# Patient Record
Sex: MALE | Race: WHITE | NOT HISPANIC OR LATINO | Employment: OTHER | URBAN - METROPOLITAN AREA
[De-identification: names, ages, dates, MRNs, and addresses within clinical notes are randomized per-mention and may not be internally consistent; named-entity substitution may affect disease eponyms.]

---

## 2017-02-22 ENCOUNTER — ALLSCRIPTS OFFICE VISIT (OUTPATIENT)
Dept: OTHER | Facility: OTHER | Age: 82
End: 2017-02-22

## 2017-03-23 ENCOUNTER — ALLSCRIPTS OFFICE VISIT (OUTPATIENT)
Dept: OTHER | Facility: OTHER | Age: 82
End: 2017-03-23

## 2017-03-23 DIAGNOSIS — L98.9 DISORDER OF SKIN OR SUBCUTANEOUS TISSUE: ICD-10-CM

## 2017-04-21 ENCOUNTER — ALLSCRIPTS OFFICE VISIT (OUTPATIENT)
Dept: OTHER | Facility: OTHER | Age: 82
End: 2017-04-21

## 2018-02-19 ENCOUNTER — OFFICE VISIT (OUTPATIENT)
Dept: DERMATOLOGY | Facility: CLINIC | Age: 83
End: 2018-02-19
Payer: COMMERCIAL

## 2018-02-19 DIAGNOSIS — Z85.828 HISTORY OF SKIN CANCER: ICD-10-CM

## 2018-02-19 DIAGNOSIS — L98.9 SKIN LESION: Primary | ICD-10-CM

## 2018-02-19 DIAGNOSIS — L82.1 SEBORRHEIC KERATOSIS: ICD-10-CM

## 2018-02-19 DIAGNOSIS — Z13.89 SCREENING FOR SKIN CONDITION: ICD-10-CM

## 2018-02-19 DIAGNOSIS — L57.0 ACTINIC KERATOSIS: ICD-10-CM

## 2018-02-19 PROCEDURE — 11100 PR BIOPSY OF SKIN LESION: CPT | Performed by: DERMATOLOGY

## 2018-02-19 PROCEDURE — 17000 DESTRUCT PREMALG LESION: CPT | Performed by: DERMATOLOGY

## 2018-02-19 PROCEDURE — 99213 OFFICE O/P EST LOW 20 MIN: CPT | Performed by: DERMATOLOGY

## 2018-02-19 PROCEDURE — 17003 DESTRUCT PREMALG LES 2-14: CPT | Performed by: DERMATOLOGY

## 2018-02-19 RX ORDER — BIOTIN 1 MG
TABLET ORAL
COMMUNITY

## 2018-02-19 RX ORDER — DUTASTERIDE AND TAMSULOSIN HYDROCHLORIDE CAPSULES .5; .4 MG/1; MG/1
1 CAPSULE ORAL
COMMUNITY
Start: 2017-11-02

## 2018-02-19 NOTE — PATIENT INSTRUCTIONS
Skin lesion possible squamous cell carcinoma await results of biopsy before planning complete excision  Wound care instructions given to patient  Actinic Keratosis:  Patient advised lesions are precancers  These should resolve with cryosurgery if not to let us know sun block recommended    Seborrheic keratosis patient reassured these are normal growths we acquire with age no treatment needed  History of skin cancer in no recurrence nothing else atypical sunblock recommended follow-up in 4 months  Screening for dermatologic disorders nothing else of concern noted on complete exam follow-up in 4 months

## 2018-02-19 NOTE — PROGRESS NOTES
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  Authorizing Provider   Cholecalciferol (VITAMIN D3) 1000 units CAPS Take by mouth   Yes Historical Provider, MD   cyanocobalamin 100 MCG tablet Take by mouth   Yes Historical Provider, MD   Dutasteride-Tamsulosin HCl 0 5-0 4 MG CAPS Take 1 capsule by mouth 17  Yes Historical Provider, MD   triamcinolone (KENALOG) 0 1 % ointment Apply topically 17  Yes Historical Provider, MD       Subjective:     Review of Systems:    General: negative for - chills, fatigue, fever,  weight gain or weight loss  Psychological: negative for - anxiety, behavioral disorder, concentration difficulties, decreased libido, depression, irritability, memory difficulties, mood swings, sleep disturbances or suicidal ideation  ENT: negative for - hearing difficulties , nasal congestion, nasal discharge, oral lesions, sinus pain, sneezing, sore throat  Allergy and Immunology: negative for - hives, insect bite sensitivity,  Hematological and Lymphatic: negative for - bleeding problems, blood clots,bruising, swollen lymph nodes  Endocrine: negative for - hair pattern changes, hot flashes, malaise/lethargy, mood swings, palpitations, polydipsia/polyuria, skin changes, temperature intolerance or unexpected weight change  Respiratory: negative for - cough, hemoptysis, orthopnea, shortness of breath, or wheezing  Cardiovascular: negative for - chest pain, dyspnea on exertion, edema,  Gastrointestinal: negative for - abdominal pain, nausea/vomiting  Genito-Urinary: negative for - dysuria, incontinence, irregular/heavy menses or urinary frequency/urgency  Musculoskeletal: negative for - gait disturbance, joint pain, joint stiffness, joint swelling, muscle pain, muscular weakness  Dermatological:  As in HPI  Neurological: negative for confusion, dizziness, headaches, impaired coordination/balance, memory loss, numbness/tingling, seizures, speech problems, tremors or weakness       Objective: There were no vitals taken for this visit  Physical Exam:    General Appearance:    Alert, cooperative, no distress   Head:    Normocephalic, without obvious abnormality, atraumatic           Skin:   A full skin exam was performed including scalp, head scalp, eyes, ears, nose, lips, neck, chest, axilla, abdomen, back, buttocks, bilateral upper extremities, bilateral lower extremities, hands, feet, fingers, toes, fingernails, and toenails  Dome-shaped 7 mm indurated papule noted on the left dorsum of the hand scaling erythematous areas noted below normal keratotic papules with peak greasy stuck on appearance previous sites of skin cancer well healed without recurrence nothing else atypical noted on exam       Physical Exam   Constitutional:       HENT:   Head:       Shave Biopsy Procedure Note    Pre-operative Diagnosis:    Plan:  1  Instructed to keep the wound dry and covered for 24 and clean thereafter  2  Warning signs of infection were reviewed      3  Recommended that the patient use OTC acetaminophen as needed for pain  4  Return  Pending results of biopsy(ies)    Locations:L dorsum of the hand    Indications: r/o squamous cell cancer    Anesthesia: Lidocaine 1% without epinephrine without added sodium bicarbonate    Procedure Details     Patient informed of the risks (including bleeding and infection) and benefits of the   procedure and Verbal informed consent obtained  The lesion and surrounding area were given a sterile prep using alcohol and draped in the usual sterile fashion  A Blue blade razor was used to obtain a specimen  Hemostasis achieved with aluminum chloride  Petrolatum and a sterile dressing applied  The specimen was sent for pathologic examination  The patient tolerated the procedure(s) well  Complications:  none  Cryotherapy Procedure Note    Pre-operative Diagnosis: actinic keratosis    Plan:  1  Instructed to keep the area dry and clean thereafter  Apply petrolatum if area gets crusty  2  Recommended that the patient use acetaminophen  as needed for pain  Locations: R ear and dorsum of L hand    Indications: Destruction of actinic keratosis x5    Patient informed of risks (permanent scarring, infection, light or dark discoloration, bleeding, infection, weakness, numbness and recurrence of the lesion) and benefits of the procedure and verbal informed consent obtained  The areas are treated with liquid nitrogen therapy, frozen until ice ball extended 2 mm beyond lesion, allowed to thaw, and treated again  The patient tolerated procedure well  The patient was instructed on post-op care, warned that there may be blister formation, redness and pain  Recommend OTC analgesia as needed for pain  Condition:  Stable    Complications:  none  Assessment:     1  History of skin cancer     2  Screening for skin condition     3  Actinic keratosis     4  Skin lesion     5   Seborrheic keratosis           Plan:   Skin lesion possible squamous cell carcinoma await results of biopsy before planning complete excision  Actinic Keratosis:  Patient advised lesions are precancers  These should resolve with cryosurgery if not to let us know sun block recommended  Seborrheic keratosis patient reassured these are normal growths we acquire with age no treatment needed  History of skin cancer in no recurrence nothing else atypical sunblock recommended follow-up in 4 months  Screening for dermatologic disorders nothing else of concern noted on complete exam follow-up in 4 months    Arely Rosado MD  2/19/2018,12:41 PM    Portions of the record may have been created with voice recognition software   Occasional wrong word or "sound a like" substitutions may have occurred due to the inherent limitations of voice recognition software   Read the chart carefully and recognize, using context, where substitutions have occurred

## 2018-03-13 ENCOUNTER — PROCEDURE VISIT (OUTPATIENT)
Dept: DERMATOLOGY | Facility: CLINIC | Age: 83
End: 2018-03-13
Payer: COMMERCIAL

## 2018-03-13 ENCOUNTER — TELEPHONE (OUTPATIENT)
Dept: DERMATOLOGY | Facility: CLINIC | Age: 83
End: 2018-03-13

## 2018-03-13 DIAGNOSIS — L20.9 ATOPIC DERMATITIS AND RELATED CONDITION: Primary | ICD-10-CM

## 2018-03-13 DIAGNOSIS — C44.629 SQUAMOUS CELL CARCINOMA OF DORSUM OF LEFT HAND: Primary | ICD-10-CM

## 2018-03-13 PROCEDURE — 11623 EXC S/N/H/F/G MAL+MRG 2.1-3: CPT | Performed by: DERMATOLOGY

## 2018-03-13 PROCEDURE — 12042 INTMD RPR N-HF/GENIT2.6-7.5: CPT | Performed by: DERMATOLOGY

## 2018-03-13 NOTE — PROGRESS NOTES
[address] [facility] DERMATOLOGY  [address]  [address] [address]     MRN: [id number] : 3/10/1929  Encounter: [id number]  Patient Information: Karissa Null    Subjective:     79-year-old male presents for previously biopsied squamous cell carcinoma left dorsum hand the lesion has been growing since patient was last     Objective: There were no vitals taken for this visit  Physical Exam:    General Appearance:    Alert, cooperative, no distress   Skin:  13 mm keratotic nodule dorsum a left  awd     Procedure name: Excision with intermediate layered closure        Location:  Left dorsum of the hand    Diagnosis: Squamous cell carcinoma    Size of lesion: 13 mm    Margins: 4 mm    Size + Margins: 21mm    I explained the diagnosis to the patient and we recommend an excision of the lesion for diagnosis and/or treatment  Potential complications include, but are not limited to: scarring, bleeding, infection, incomplete removal, recurrence, and nerve damage  The risks, benefits, and alternatives were discussed with the patient in detail  The location of the tumor was identified, circled, and confirmed by the patient  The correct patient, site, and procedure were confirmed  Anesthesia: 1% xylocaine with 1:100,000 epinephrine 6 cc  The patient was brought into the room, prepped and draped sterilely in the usual manner and anesthesia was administered by local infiltration  A fusiform shape was drawn around the lesion, and the margins were incised to the level of the subcutaneous fat with a number 15cc Bard-Juanpablo blade  The tissue was removed with sharp and blunt dissection  The lateral margins of the resulting defect were undermined with sharp and blunt dissection  Hemostasis was achieved with electrocautery  The deeper layers of the defect, including subcutaneous fat and fascia, had to be approximated to reduce tension on the suture line  Layered wound closure was performed  The wound was cleaned with saline, dried off, petroleum applied, and the wound was covered with a pressure dressing  The patient was given detailed verbal and written instructions on postoperative care  Suture for adipose/fascial/dermal layer closure: 4-0  vicryl 4sutures interrupted    Suture used to approximate epidermis: 5-0  nylon 9sutures interrupted    Final wound length: 5 0 cm    Follow-up in: 10 days  Patient tolerated procedure well without complications  Assessment:     1  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 Authorizing Provider   Cholecalciferol (VITAMIN D3) 1000 units CAPS Take by mouth   Yes Historical Provider, MD   cyanocobalamin 100 MCG tablet Take by mouth   Yes Historical Provider, MD   Dutasteride-Tamsulosin HCl 0 5-0 4 MG CAPS Take 1 capsule by mouth 11/2/17  Yes Historical Provider, MD   triamcinolone (KENALOG) 0 1 % ointment Apply topically 2/22/17  Yes Historical Provider, MD     No Known Allergies    Pawan Rae MD  3/13/2018,2:53 PM    Portions of the record may have been created with voice recognition software   Occasional wrong word or "sound a like" substitutions may have occurred due to the inherent limitations of voice recognition software   Read the chart carefully and recognize, using context, where substitutions have occurred

## 2018-03-23 ENCOUNTER — CLINICAL SUPPORT (OUTPATIENT)
Dept: DERMATOLOGY | Facility: CLINIC | Age: 83
End: 2018-03-23

## 2018-03-23 DIAGNOSIS — C44.629 SQUAMOUS CELL CARCINOMA OF DORSUM OF LEFT HAND: Primary | ICD-10-CM

## 2018-03-23 PROCEDURE — 99024 POSTOP FOLLOW-UP VISIT: CPT | Performed by: DERMATOLOGY

## 2018-03-23 NOTE — PATIENT INSTRUCTIONS
STERI-STRIPS (BUTTERFLY) POST SURGERY        Steri-Strips have been placed over your incision site to give added support  Please continue to bathe the area as usual     Eventually the Steri-Strips will begin to peel off on the ends  Snip the raised ends off with scissors and let the rest fall off on their own  If you have any questions, please call our office   [phone]

## 2018-03-23 NOTE — PROGRESS NOTES
[address] [address] [facility] DERMATOLOGY  [address]  [address] [address]     MRN: [id number] : 3/10/1929  Encounter: [id number]  Patient Information: Prateek Tovar    Subjective:     41-year-old male presents for follow-up for previously excised squamous cell carcinoma left dorsum of the hand     Objective: There were no vitals taken for this visit  Physical Exam:    General Appearance:    Alert, cooperative, no distress   Skin:    Previous site of excision with slight dehiscence notedProcedure:  Suture removal  Site of excision: left dorsum of the hand   Wound was cleansed with saline  Wound is intact  Sutures removed  Steri-Strips applied  Biopsy revealed  Squamous cell carcinoma margins clear           Assessment:     1  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 Authorizing Provider   Cholecalciferol (VITAMIN D3) 1000 units CAPS Take by mouth    Historical Provider, MD   cyanocobalamin 100 MCG tablet Take by mouth    Historical Provider, MD   Dutasteride-Tamsulosin HCl 0 5-0 4 MG CAPS Take 1 capsule by mouth 17   Historical Provider, MD   triamcinolone (KENALOG) 0 1 % ointment Apply topically 2 (two) times a day 3/13/18   Pati Mccoy MD     No Known Allergies    Pati Mccoy MD  3/23/2018,10:49 AM    Portions of the record may have been created with voice recognition software   Occasional wrong word or "sound a like" substitutions may have occurred due to the inherent limitations of voice recognition software   Read the chart carefully and recognize, using context, where substitutions have occurred

## 2018-04-13 ENCOUNTER — CLINICAL SUPPORT (OUTPATIENT)
Dept: DERMATOLOGY | Facility: CLINIC | Age: 83
End: 2018-04-13

## 2018-04-13 DIAGNOSIS — C44.629 SQUAMOUS CELL CARCINOMA OF DORSUM OF LEFT HAND: Primary | ICD-10-CM

## 2018-04-13 PROCEDURE — 99024 POSTOP FOLLOW-UP VISIT: CPT | Performed by: DERMATOLOGY

## 2018-04-13 NOTE — PROGRESS NOTES
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 Authorizing Provider   Cholecalciferol (VITAMIN D3) 1000 units CAPS Take by mouth    Historical Provider, MD   cyanocobalamin 100 MCG tablet Take by mouth    Historical Provider, MD   Dutasteride-Tamsulosin HCl 0 5-0 4 MG CAPS Take 1 capsule by mouth 17   Historical Provider, MD   triamcinolone (KENALOG) 0 1 % ointment Apply topically 2 (two) times a day 3/13/18   Yadiel Alan MD     No Known Allergies    Yadiel Alan MD  ,59:29 AM    Portions of the record may have been created with voice recognition software   Occasional wrong word or "sound a like" substitutions may have occurred due to the inherent limitations of voice recognition software   Read the chart carefully and recognize, using context, where substitutions have occurred

## 2018-04-13 NOTE — PATIENT INSTRUCTIONS
Previous excision site well-healed without evidence of disease no further treatment needed follow-up as previously scheduled

## 2018-06-20 ENCOUNTER — OFFICE VISIT (OUTPATIENT)
Dept: DERMATOLOGY | Facility: CLINIC | Age: 83
End: 2018-06-20
Payer: COMMERCIAL

## 2018-06-20 DIAGNOSIS — L57.0 ACTINIC KERATOSIS: Primary | ICD-10-CM

## 2018-06-20 DIAGNOSIS — L82.1 SEBORRHEIC KERATOSIS: ICD-10-CM

## 2018-06-20 DIAGNOSIS — Z85.828 HISTORY OF SKIN CANCER: ICD-10-CM

## 2018-06-20 DIAGNOSIS — L98.9 SKIN LESION: ICD-10-CM

## 2018-06-20 DIAGNOSIS — Z13.89 SCREENING FOR SKIN CONDITION: ICD-10-CM

## 2018-06-20 PROCEDURE — 17003 DESTRUCT PREMALG LES 2-14: CPT | Performed by: DERMATOLOGY

## 2018-06-20 PROCEDURE — 17000 DESTRUCT PREMALG LESION: CPT | Performed by: DERMATOLOGY

## 2018-06-20 PROCEDURE — 88305 TISSUE EXAM BY PATHOLOGIST: CPT | Performed by: PATHOLOGY

## 2018-06-20 PROCEDURE — 99213 OFFICE O/P EST LOW 20 MIN: CPT | Performed by: DERMATOLOGY

## 2018-06-20 PROCEDURE — 11100 PR BIOPSY OF SKIN LESION: CPT | Performed by: DERMATOLOGY

## 2018-06-20 RX ORDER — TAMSULOSIN HYDROCHLORIDE 0.4 MG/1
0.4 CAPSULE ORAL
COMMUNITY
Start: 2018-05-23 | End: 2019-05-23

## 2018-06-20 RX ORDER — DUTASTERIDE 0.5 MG/1
0.5 CAPSULE, LIQUID FILLED ORAL
COMMUNITY
Start: 2018-05-23 | End: 2019-05-23

## 2018-06-20 NOTE — PATIENT INSTRUCTIONS
skin lesion possible squamous cell carcinoma will discuss potential for removal when we get the biopsy results would  Probably need excision will discuss about either coming back here or seeing  a dermatologist in his new location  Actinic Keratosis:  Patient advised lesions are precancers  These should resolve with cryosurgery if not to let us know sun block recommended  Seborrheic keratosis patient reassured these are normal growths we acquire with age no treatment needed  History of skin cancer in no recurrence nothing else atypical sunblock recommended follow-up in 6 months  Screening for dermatologic disorders nothing else of concern noted on complete exam follow-up in 6 months  abdominal wall  Treatment with Cryotherapy    The doctor has treated your skin with nitrogen, which is 320 degrees Fahrenheit below zero  He has given the treated area "frostbite "    Stinging should subside within a few hours  You can take Tylenol for pain, if needed  Over the next few days, it is normal if the area becomes reddened, a blood blister, or swollen with fluid  If the lesion treated was near the eye - you could get a swollen eye over the next few days  Do not panic! This is all temporary, and will resolve with time  There is no special treatment - just keep the area clean  Makeup and BandAids can be used, if preferred  When the area starts to dry up and peel off, using Vaseline can help healing  It usually takes up to a month for it to heal   Some lesions are recurrent and may require repeat treatments  If a lesion has not healed in one month, please don't hesitate to contact us  If you have any further questions that are not answered here, please call us  [phone]    Thank you for allowing us to care for you

## 2018-06-20 NOTE — PROGRESS NOTES
[address] [facility] DERMATOLOGY  [address]  [address] [address]     MRN: [id number] : 3/10/1929  Encounter: [id number]  Patient Information: Buzzus Meckel  Chief complaint:Skin cancer checkup    History of present illness:  77-year-old male with history of multiple skin cancers presents for overall checkup of note patient will be relocating to [facility]  At the end of the month  Patient with recent excised squamous cell carcinoma left hand  No past medical history on file  No past surgical history on file  Social History   History   Alcohol use Not on file     History   Drug use: Unknown     History   Smoking Status    Never Smoker   Smokeless Tobacco    Never Used     No family history on file  Meds/Allergies   No Known Allergies    Meds:  Prior to Admission medications    Medication Sig Start Date End Date Taking?  Authorizing Provider   dutasteride (AVODART) 0 5 mg capsule Take 0 5 mg by mouth 18 Yes Historical Provider, MD   tamsulosin (FLOMAX) 0 4 mg Take 0 4 mg by mouth 18 Yes Historical Provider, MD   Cholecalciferol (VITAMIN D3) 1000 units CAPS Take by mouth    Historical Provider, MD   cyanocobalamin 100 MCG tablet Take by mouth    Historical Provider, MD   Dutasteride-Tamsulosin HCl 0 5-0 4 MG CAPS Take 1 capsule by mouth 17   Historical Provider, MD   triamcinolone (KENALOG) 0 1 % ointment Apply topically 2 (two) times a day 3/13/18   Jessie Correa MD       Subjective:     Review of Systems:    General: negative for - chills, fatigue, fever,  weight gain or weight loss  Psychological: negative for - anxiety, behavioral disorder, concentration difficulties, decreased libido, depression, irritability, memory difficulties, mood swings, sleep disturbances or suicidal ideation  ENT: negative for - hearing difficulties , nasal congestion, nasal discharge, oral lesions, sinus pain, sneezing, sore throat  Allergy and Immunology: negative for - hives, insect bite sensitivity,  Hematological and Lymphatic: negative for - bleeding problems, blood clots,bruising, swollen lymph nodes  Endocrine: negative for - hair pattern changes, hot flashes, malaise/lethargy, mood swings, palpitations, polydipsia/polyuria, skin changes, temperature intolerance or unexpected weight change  Respiratory: negative for - cough, hemoptysis, orthopnea, shortness of breath, or wheezing  Cardiovascular: negative for - chest pain, dyspnea on exertion, edema,  Gastrointestinal: negative for - abdominal pain, nausea/vomiting  Genito-Urinary: negative for - dysuria, incontinence, irregular/heavy menses or urinary frequency/urgency  Musculoskeletal: negative for - gait disturbance, joint pain, joint stiffness, joint swelling, muscle pain, muscular weakness  Dermatological:  As in HPI  Neurological: negative for confusion, dizziness, headaches, impaired coordination/balance, memory loss, numbness/tingling, seizures, speech problems, tremors or weakness       Objective: There were no vitals taken for this visit  Physical Exam:    General Appearance:    Alert, cooperative, no distress   Head:    Normocephalic, without obvious abnormality, atraumatic           Skin:   A full skin exam was performed including scalp, head scalp, eyes, ears, nose, lips, neck, chest, axilla, abdomen, back, buttocks, bilateral upper extremities, bilateral lower extremities, hands, feet, fingers, toes, fingernails, and toenails  A indurated 12 mm keratotic papule noted left angle of the jaw scaling erythematous areas noted below normal keratotic papules with greasy stuck on appearance previous sites of skin cancer well healed without recurrence nothing else atypical noted on exam   Physical Exam   Constitutional:       HENT:   Head:            Shave Biopsy Procedure Note    Pre-operative Diagnosis: possible squamous cell carcinoma    Plan:  1   Instructed to keep the wound dry and covered for 24 and clean thereafter  2  Warning signs of infection were reviewed  3  Recommended that the patient use OTC acetaminophen as needed for pain  4  Return  Pending results of biopsy(ies)    Locations: left angle of the jaw    Indications:  Irritated lesion    Anesthesia: Lidocaine 1% without epinephrine without added sodium bicarbonate    Procedure Details     Patient informed of the risks (including bleeding and infection) and benefits of the   procedure and Verbal informed consent obtained  The lesion and surrounding area were given a sterile prep using alcohol and draped in the usual sterile fashion  A Blue blade razor was used to obtain a specimen  Hemostasis achieved with aluminum chloride  Petrolatum and a sterile dressing applied  The specimen was sent for pathologic examination  The patient tolerated the procedure(s) well  Complications:  none  Cryotherapy Procedure Note    Pre-operative Diagnosis: actinic keratosis    Plan:  1  Instructed to keep the area dry and clean thereafter  Apply petrolatum if area gets crusty  2  Recommended that the patient use acetaminophen  as needed for pain  Locations:  Right arm dorsum of the hand and right cheek    Indications: Destruction of  Actinic keratoses x 7    Patient informed of risks (permanent scarring, infection, light or dark discoloration, bleeding, infection, weakness, numbness and recurrence of the lesion) and benefits of the procedure and verbal informed consent obtained  The areas are treated with liquid nitrogen therapy, frozen until ice ball extended 2 mm beyond lesion, allowed to thaw, and treated again  The patient tolerated procedure well  The patient was instructed on post-op care, warned that there may be blister formation, redness and pain  Recommend OTC analgesia as needed for pain  Condition:  Stable    Complications:  none  Assessment:     1  Actinic keratosis     2   Seborrheic keratosis     3  Skin lesion     4  Screening for skin condition     5  History of skin cancer           Plan:    skin lesion possible squamous cell carcinoma will discuss potential for removal when we get the biopsy results would  Probably need excision will discuss about either coming back here or seeing  a dermatologist in his new location  Actinic Keratosis:  Patient advised lesions are precancers  These should resolve with cryosurgery if not to let us know sun block recommended  Seborrheic keratosis patient reassured these are normal growths we acquire with age no treatment needed  History of skin cancer in no recurrence nothing else atypical sunblock recommended follow-up in 6 months  Screening for dermatologic disorders nothing else of concern noted on complete exam follow-up in 6 months    Isidoro Yao MD  6/20/2018,12:32 PM    Portions of the record may have been created with voice recognition software   Occasional wrong word or "sound a like" substitutions may have occurred due to the inherent limitations of voice recognition software   Read the chart carefully and recognize, using context, where substitutions have occurred

## 2018-07-12 ENCOUNTER — PROCEDURE VISIT (OUTPATIENT)
Dept: DERMATOLOGY | Facility: CLINIC | Age: 83
End: 2018-07-12
Payer: COMMERCIAL

## 2018-07-12 DIAGNOSIS — D04.39 SQUAMOUS CELL CARCINOMA IN SITU (SCCIS) OF SKIN OF JAWLINE: Primary | ICD-10-CM

## 2018-07-12 PROCEDURE — 99213 OFFICE O/P EST LOW 20 MIN: CPT | Performed by: DERMATOLOGY

## 2018-07-12 RX ORDER — FLUOROURACIL 50 MG/G
CREAM TOPICAL 2 TIMES DAILY
Qty: 40 G | Refills: 0 | Status: SHIPPED | OUTPATIENT
Start: 2018-07-12

## 2018-07-12 NOTE — PATIENT INSTRUCTIONS
previous site poorly defined therefore will be elected to go ahead treat the area with 5% fluorouracil patient to treated for 4 weeks and then to discontinue therapy and should heal completely in 8 weeks    Patient made decided to follow-up with dermatologist down in [facility] or come back here in December

## 2018-07-12 NOTE — PROGRESS NOTES
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  Authorizing Provider   Cholecalciferol (VITAMIN D3) 1000 units CAPS Take by mouth   Yes Historical Provider, MD   cyanocobalamin 100 MCG tablet Take by mouth   Yes Historical Provider, MD   dutasteride (AVODART) 0 5 mg capsule Take 0 5 mg by mouth 18 Yes Historical Provider, MD Fischerasteride-Tamsulosin HCl 0 5-0 4 MG CAPS Take 1 capsule by mouth 17  Yes Historical Provider, MD   tamsulosin (FLOMAX) 0 4 mg Take 0 4 mg by mouth 18 Yes Historical Provider, MD   triamcinolone (KENALOG) 0 1 % ointment Apply topically 2 (two) times a day 3/13/18  Yes Orlando Glynn MD     No Known Allergies    Orlando Glynn MD  2018,2:28 PM    Portions of the record may have been created with voice recognition software   Occasional wrong word or "sound a like" substitutions may have occurred due to the inherent limitations of voice recognition software   Read the chart carefully and recognize, using context, where substitutions have occurred

## 2018-10-12 ENCOUNTER — TELEPHONE (OUTPATIENT)
Dept: INTERNAL MEDICINE CLINIC | Facility: CLINIC | Age: 83
End: 2018-10-12

## 2018-10-12 NOTE — TELEPHONE ENCOUNTER
Patient requested his medical records be mailed to hi new dermatologist  He has moved out of the area   They were mailed today to [place] 35 [place]